# Patient Record
Sex: FEMALE | Race: BLACK OR AFRICAN AMERICAN | URBAN - METROPOLITAN AREA
[De-identification: names, ages, dates, MRNs, and addresses within clinical notes are randomized per-mention and may not be internally consistent; named-entity substitution may affect disease eponyms.]

---

## 2021-04-14 ENCOUNTER — OFFICE VISIT (OUTPATIENT)
Dept: URBAN - METROPOLITAN AREA CLINIC 96 | Facility: CLINIC | Age: 41
End: 2021-04-14
Payer: COMMERCIAL

## 2021-04-14 ENCOUNTER — DASHBOARD ENCOUNTERS (OUTPATIENT)
Age: 41
End: 2021-04-14

## 2021-04-14 ENCOUNTER — TELEPHONE ENCOUNTER (OUTPATIENT)
Dept: URBAN - METROPOLITAN AREA SURGERY CENTER 30 | Facility: SURGERY CENTER | Age: 41
End: 2021-04-14

## 2021-04-14 ENCOUNTER — WEB ENCOUNTER (OUTPATIENT)
Dept: URBAN - METROPOLITAN AREA CLINIC 96 | Facility: CLINIC | Age: 41
End: 2021-04-14

## 2021-04-14 VITALS
TEMPERATURE: 96.9 F | HEART RATE: 98 BPM | HEIGHT: 60 IN | BODY MASS INDEX: 20.42 KG/M2 | SYSTOLIC BLOOD PRESSURE: 115 MMHG | DIASTOLIC BLOOD PRESSURE: 75 MMHG | WEIGHT: 104 LBS

## 2021-04-14 DIAGNOSIS — K62.5 RECTAL BLEEDING: ICD-10-CM

## 2021-04-14 DIAGNOSIS — R10.13 DYSPEPSIA: ICD-10-CM

## 2021-04-14 DIAGNOSIS — R68.81 EARLY SATIETY: ICD-10-CM

## 2021-04-14 DIAGNOSIS — R14.0 BLOATING: ICD-10-CM

## 2021-04-14 DIAGNOSIS — R11.0 NAUSEA: ICD-10-CM

## 2021-04-14 DIAGNOSIS — K59.00 CONSTIPATION, UNSPECIFIED CONSTIPATION TYPE: ICD-10-CM

## 2021-04-14 PROCEDURE — 99204 OFFICE O/P NEW MOD 45 MIN: CPT | Performed by: INTERNAL MEDICINE

## 2021-04-14 RX ORDER — POLYETHYLENE GLYCOL 3350, SODIUM SULFATE, SODIUM CHLORIDE, POTASSIUM CHLORIDE, ASCORBIC ACID, SODIUM ASCORBATE 140-9-5.2G
AS DIRECTED KIT ORAL ONCE
Refills: 0 | OUTPATIENT
Start: 2021-04-14 | End: 2021-04-15

## 2021-04-14 RX ORDER — POLYETHYLENE GLYCOL 3350, SODIUM SULFATE, SODIUM CHLORIDE, POTASSIUM CHLORIDE, ASCORBIC ACID, SODIUM ASCORBATE 140-9-5.2G
AS DIRECTED KIT ORAL ONCE
Qty: 1 | Refills: 0 | OUTPATIENT
Start: 2021-04-14 | End: 2021-04-15

## 2021-04-14 NOTE — HPI-TODAY'S VISIT:
Patient presents self referred for constipation, bloating for "many months", although reports "probably ongoing for years if I am honest". "My bowels have stopped". Will have BM infrequently and pebble like stools. Now having BM perhaps once weekly. No rectal bleeding, nausea with no emesis, generalized abdominal pain. No chance pregnant. Denies any medication changes, no diet changes. Not trying to get pregnant.   Has tried months ago tried magnesium citrate which helped. Has not tried Miralax regularly.   No NSAIDs, no food allergies or diet restrictions.   Hx of hemorrhoids. Does note BRBPR she attributes to her hemorrhoids.   No hx of hepatitis, no hx of PRBC. Has lost a few pounds unintentionally. No hx of eating d/o, no psych hx but reports "I think I have some depression" but no SI or HI.  No primary MD. No recent labs. Does report remote hx of low thyroid levels years ago, no recent recheck.

## 2021-04-15 ENCOUNTER — OFFICE VISIT (OUTPATIENT)
Dept: URBAN - METROPOLITAN AREA CLINIC 98 | Facility: CLINIC | Age: 41
End: 2021-04-15

## 2021-04-15 ENCOUNTER — TELEPHONE ENCOUNTER (OUTPATIENT)
Dept: URBAN - METROPOLITAN AREA CLINIC 92 | Facility: CLINIC | Age: 41
End: 2021-04-15

## 2021-04-15 LAB
HEMATOCRIT: 37.1
HEMOGLOBIN: 10.9
MCH: 23.9
MCHC: 29.4
MCV: 81
NRBC: (no result)
PLATELETS: 445
RBC: 4.56
RDW: 15.8
TSH: 4.17
WBC: 8.2

## 2021-05-20 PROBLEM — 14760008: Status: ACTIVE | Noted: 2021-04-14

## 2021-06-04 ENCOUNTER — OFFICE VISIT (OUTPATIENT)
Dept: URBAN - METROPOLITAN AREA SURGERY CENTER 18 | Facility: SURGERY CENTER | Age: 41
End: 2021-06-04